# Patient Record
Sex: FEMALE | Race: OTHER | HISPANIC OR LATINO
[De-identification: names, ages, dates, MRNs, and addresses within clinical notes are randomized per-mention and may not be internally consistent; named-entity substitution may affect disease eponyms.]

---

## 2017-02-27 ENCOUNTER — APPOINTMENT (OUTPATIENT)
Dept: OPHTHALMOLOGY | Facility: CLINIC | Age: 52
End: 2017-02-27

## 2017-03-06 ENCOUNTER — APPOINTMENT (OUTPATIENT)
Dept: OPHTHALMOLOGY | Facility: CLINIC | Age: 52
End: 2017-03-06

## 2017-10-09 ENCOUNTER — APPOINTMENT (OUTPATIENT)
Dept: OPHTHALMOLOGY | Facility: CLINIC | Age: 52
End: 2017-10-09
Payer: COMMERCIAL

## 2017-10-09 DIAGNOSIS — H35.89 OTHER SPECIFIED RETINAL DISORDERS: ICD-10-CM

## 2017-10-09 PROCEDURE — 92134 CPTRZ OPH DX IMG PST SGM RTA: CPT

## 2017-10-09 PROCEDURE — 68761 CLOSE TEAR DUCT OPENING: CPT | Mod: E2,E4

## 2017-10-09 PROCEDURE — 92004 COMPRE OPH EXAM NEW PT 1/>: CPT | Mod: 25

## 2017-11-27 ENCOUNTER — APPOINTMENT (OUTPATIENT)
Dept: OPHTHALMOLOGY | Facility: CLINIC | Age: 52
End: 2017-11-27

## 2018-02-07 ENCOUNTER — APPOINTMENT (OUTPATIENT)
Dept: OPHTHALMOLOGY | Facility: CLINIC | Age: 53
End: 2018-02-07
Payer: COMMERCIAL

## 2018-02-07 PROCEDURE — 92012 INTRM OPH EXAM EST PATIENT: CPT

## 2018-03-07 ENCOUNTER — APPOINTMENT (OUTPATIENT)
Dept: OPHTHALMOLOGY | Facility: CLINIC | Age: 53
End: 2018-03-07

## 2018-03-14 ENCOUNTER — APPOINTMENT (OUTPATIENT)
Dept: OPHTHALMOLOGY | Facility: CLINIC | Age: 53
End: 2018-03-14
Payer: COMMERCIAL

## 2018-03-14 DIAGNOSIS — H04.123 DRY EYE SYNDROME OF BILATERAL LACRIMAL GLANDS: ICD-10-CM

## 2018-03-14 PROCEDURE — 92012 INTRM OPH EXAM EST PATIENT: CPT

## 2020-12-10 ENCOUNTER — APPOINTMENT (OUTPATIENT)
Dept: OPHTHALMOLOGY | Facility: CLINIC | Age: 55
End: 2020-12-10
Payer: COMMERCIAL

## 2020-12-10 ENCOUNTER — NON-APPOINTMENT (OUTPATIENT)
Age: 55
End: 2020-12-10

## 2020-12-10 PROCEDURE — 92014 COMPRE OPH EXAM EST PT 1/>: CPT

## 2020-12-10 PROCEDURE — 99072 ADDL SUPL MATRL&STAF TM PHE: CPT

## 2020-12-15 ENCOUNTER — TRANSCRIPTION ENCOUNTER (OUTPATIENT)
Age: 55
End: 2020-12-15

## 2020-12-21 ENCOUNTER — EMERGENCY (EMERGENCY)
Facility: HOSPITAL | Age: 55
LOS: 1 days | Discharge: HOME | End: 2020-12-21
Admitting: EMERGENCY MEDICINE
Payer: COMMERCIAL

## 2020-12-21 VITALS
DIASTOLIC BLOOD PRESSURE: 74 MMHG | OXYGEN SATURATION: 98 % | HEART RATE: 86 BPM | SYSTOLIC BLOOD PRESSURE: 126 MMHG | TEMPERATURE: 98 F | RESPIRATION RATE: 17 BRPM

## 2020-12-21 DIAGNOSIS — Z20.828 CONTACT WITH AND (SUSPECTED) EXPOSURE TO OTHER VIRAL COMMUNICABLE DISEASES: ICD-10-CM

## 2020-12-21 LAB — SARS-COV-2 RNA SPEC QL NAA+PROBE: SIGNIFICANT CHANGE UP

## 2020-12-21 PROCEDURE — 99283 EMERGENCY DEPT VISIT LOW MDM: CPT

## 2020-12-21 PROCEDURE — U0003: CPT

## 2020-12-21 NOTE — ED PROVIDER NOTE - PATIENT PORTAL LINK FT
You can access the FollowMyHealth Patient Portal offered by [facility] by registering at the following website: [email or web address]. By joining [facility]’s FollowMyHealth portal, you will also be able to view your health information using other applications (apps) compatible with our system.

## 2021-02-09 ENCOUNTER — APPOINTMENT (OUTPATIENT)
Dept: OPHTHALMOLOGY | Facility: CLINIC | Age: 56
End: 2021-02-09
Payer: COMMERCIAL

## 2021-02-09 ENCOUNTER — NON-APPOINTMENT (OUTPATIENT)
Age: 56
End: 2021-02-09

## 2021-02-09 PROCEDURE — 92012 INTRM OPH EXAM EST PATIENT: CPT

## 2021-02-09 PROCEDURE — 92285 EXTERNAL OCULAR PHOTOGRAPHY: CPT

## 2021-02-09 PROCEDURE — 99072 ADDL SUPL MATRL&STAF TM PHE: CPT

## 2021-03-24 ENCOUNTER — APPOINTMENT (OUTPATIENT)
Dept: ORTHOPEDIC SURGERY | Facility: CLINIC | Age: 56
End: 2021-03-24
Payer: COMMERCIAL

## 2021-03-24 VITALS
BODY MASS INDEX: 24.3 KG/M2 | SYSTOLIC BLOOD PRESSURE: 110 MMHG | HEIGHT: 56 IN | WEIGHT: 108 LBS | DIASTOLIC BLOOD PRESSURE: 70 MMHG

## 2021-03-24 DIAGNOSIS — M25.511 PAIN IN RIGHT SHOULDER: ICD-10-CM

## 2021-03-24 DIAGNOSIS — Z87.39 PERSONAL HISTORY OF OTHER DISEASES OF THE MUSCULOSKELETAL SYSTEM AND CONNECTIVE TISSUE: ICD-10-CM

## 2021-03-24 DIAGNOSIS — M75.121 COMPLETE ROTATOR CUFF TEAR OR RUPTURE OF RIGHT SHOULDER, NOT SPECIFIED AS TRAUMATIC: ICD-10-CM

## 2021-03-24 DIAGNOSIS — Z78.9 OTHER SPECIFIED HEALTH STATUS: ICD-10-CM

## 2021-03-24 PROCEDURE — 99072 ADDL SUPL MATRL&STAF TM PHE: CPT

## 2021-03-24 PROCEDURE — 99204 OFFICE O/P NEW MOD 45 MIN: CPT

## 2021-03-24 RX ORDER — SIMVASTATIN 40 MG/1
TABLET, FILM COATED ORAL
Refills: 0 | Status: ACTIVE | COMMUNITY

## 2021-03-24 NOTE — DISCUSSION/SUMMARY
[de-identified] : 55 year old female right hand dominant with 3 year history of previous right shoulder issue treated effectively with Physical therapy now with recurrent new exacerbation for over 4 months with pain in classic RC positions. and supraspinatus weakness.\par X rays WNL\par She has now failed a second trial of physical therapy perscribed by her Primary care. No benefit\par Plan \par MRI R/o RC tear specifically supraspinatus and check LHB. \par F/U 2 weeks \par

## 2021-03-24 NOTE — HISTORY OF PRESENT ILLNESS
[de-identified] : 56 y/o female presents for Right Shoulder pain. Patient is right hand dominant and works as a . She states that shes had Right Shoulder pain 3 years ago which was treated with Physical Therapy with good improvement. However, in February 2021 she states that her pain returned now sharp and has effected her daily activities. She states that she has no ROM and is unable to due any heavy lifting or overhead activity. She initially followed her care with her PCP who sent her to [facility], her first session was yesterday with no improvement. Patient has brought in xray imaging from [facility] along with report for review.

## 2021-03-24 NOTE — PHYSICAL EXAM
[de-identified] : Patient is well nourished, well-developed, in no acute distress, with appropriate mood and affect. The patient is oriented to time, place, and person. Gait evaluation does not reveal a limp. The skin examination does not reveal obvious lesions, lacerations, or ecchymosis.\par right shoulder  ER 80 IR L1 [id number] with T5 opposite. \par Positive Neer and Byers and SS 4/5 ER Subscap 5/5 positive Speeds test \par  [de-identified] : Reviewed old x rays taken at an ER which showed normal bony anatomy and no head elevation subluxation or Fx.

## 2021-04-05 RX ORDER — TRAMADOL HYDROCHLORIDE 50 MG/1
50 TABLET, COATED ORAL 3 TIMES DAILY
Qty: 21 | Refills: 0 | Status: ACTIVE | COMMUNITY
Start: 2021-04-05 | End: 1900-01-01

## 2021-04-15 ENCOUNTER — APPOINTMENT (OUTPATIENT)
Dept: ORTHOPEDIC SURGERY | Facility: CLINIC | Age: 56
End: 2021-04-15
Payer: COMMERCIAL

## 2021-04-15 VITALS — WEIGHT: 108 LBS | HEIGHT: 56 IN | BODY MASS INDEX: 24.3 KG/M2

## 2021-04-15 PROCEDURE — 99214 OFFICE O/P EST MOD 30 MIN: CPT | Mod: 25

## 2021-04-15 PROCEDURE — 99072 ADDL SUPL MATRL&STAF TM PHE: CPT

## 2021-04-15 PROCEDURE — 20610 DRAIN/INJ JOINT/BURSA W/O US: CPT | Mod: RT

## 2021-04-15 NOTE — PHYSICAL EXAM
[de-identified] : Patient is well nourished, well-developed, in no acute distress, with appropriate mood and affect. The patient is oriented to time, place, and person. Gait evaluation does not reveal a limp. The skin examination does not reveal obvious lesions, lacerations, or ecchymosis.\par Right shoulder very tender AC joint. \par Positive impingement sign pos Neer but 4+ SS 5/5 IS\par Injection test in AC joint took away all pain and brought back full isometric strength. \par \par  [de-identified] : MRI images and report reviewed which show AC joint OA/ significant tendonitis and impingement with SA spur. SMall interstitial cyst or fissure. \par

## 2021-04-15 NOTE — PROCEDURE
[de-identified] : The right shoulder was prepped with alchohol and ethyl chloride was used to numb the skin. A 5 cc injection with 2 cc xylocaine, 2cc sensoricaine and 1 cc depomedrol , was given without complication into the AC joint and SA space. . Patient instructed that there may be an inflammatory flare for 24 hrs , to use ice or advil if needed\par

## 2021-04-15 NOTE — HISTORY OF PRESENT ILLNESS
[de-identified] : 56 y/o female presents for Right Shoulder. Patient is in office to today to review her MRI results done at [facility] on 4/10/2021. PT has helped but she still has a lot of right shoulder discomfort and some ROM limitation.

## 2021-04-15 NOTE — DISCUSSION/SUMMARY
[de-identified] : 55 year old  with persistent impingement and RC tendonitis and bursitis with predominant AC joint symptoms from djd. \par MRI shows no major full thickness tears so waiting on surgery is not a problem. \par She doesn't want to be out of work so I am suggesting AC and SA injection with further PT. \par \par \par

## 2021-06-03 ENCOUNTER — APPOINTMENT (OUTPATIENT)
Dept: ORTHOPEDIC SURGERY | Facility: CLINIC | Age: 56
End: 2021-06-03
Payer: COMMERCIAL

## 2021-06-03 PROCEDURE — 99214 OFFICE O/P EST MOD 30 MIN: CPT | Mod: 25

## 2021-06-03 PROCEDURE — 20605 DRAIN/INJ JOINT/BURSA W/O US: CPT | Mod: RT

## 2021-06-03 NOTE — PROCEDURE
[de-identified] : The right elbow was prepped with alcohol and ethyl chloride was used to numb the skin. A 3 cc injection with 1 cc xylocaine, 1cc sensoricaine and 1 cc depomedrol , was given without complication into the /latera] epicondyle. Patient instructed that there may be an inflammatory flare for 24 hrs , to use ice or advil if needed\par \par \par

## 2021-06-03 NOTE — PHYSICAL EXAM
[de-identified] : right shoulder has full AROM PROM\par Neg Byers today and neg Neer\par Right elbow tender at lateral epicondyle and some discomfort at wrist and elbow with resisted dorsiflexion. \par No pain at wrist or elbow after lateral epicondyltiis injection.

## 2021-06-03 NOTE — HISTORY OF PRESENT ILLNESS
[de-identified] : 54 y/o female presents for Right Shoulder follow up. Patient states that she is happy with the progress that she's made with physical therapy as it has improved her limitation of ROM, she states that she still has pain intermittently. Her new compliant is of her elbow she states that she'll experience pain when pulling heavy items

## 2021-06-03 NOTE — DISCUSSION/SUMMARY
[de-identified] : Shoulder has done well with AC joint and SA injection . Little to no pain now and can work. As she doesn’t want surgery this is good news. \par However she now complains of difficulty opening a jar with wrist extension and torque. Exam reveals lateral epicondyltitis and injection there took away pain. \par If she has further wrist pain will recommend hand specialist upstairs. for follow up. \par

## 2021-07-14 ENCOUNTER — APPOINTMENT (OUTPATIENT)
Dept: ORTHOPEDIC SURGERY | Facility: CLINIC | Age: 56
End: 2021-07-14

## 2021-08-05 ENCOUNTER — APPOINTMENT (OUTPATIENT)
Dept: ORTHOPEDIC SURGERY | Facility: CLINIC | Age: 56
End: 2021-08-05
Payer: COMMERCIAL

## 2021-08-05 VITALS — WEIGHT: 108 LBS | BODY MASS INDEX: 24.3 KG/M2 | HEIGHT: 56 IN

## 2021-08-05 PROCEDURE — 99214 OFFICE O/P EST MOD 30 MIN: CPT

## 2021-08-05 NOTE — PHYSICAL EXAM
[de-identified] : Right shoulder tender Ac joint Positive Neer and Byers but SS IS subscap 5/5 isometric strength. \par

## 2021-08-05 NOTE — HISTORY OF PRESENT ILLNESS
[de-identified] : 54 y/o female presents for Right Shoulder follow up. Patient states that she hasn’t noticed any improvement since her last visit and would like to discuss surgery options .Pain is predominantly at superior AC joint.

## 2021-08-05 NOTE — DISCUSSION/SUMMARY
[de-identified] : Gertrude has recurrent AC joint pain with x-rays and MRI showing severe AC arthropathy and moderate impingement with only shallow partial SS tear. \par Plan \par Arthroscopic Ac resection and SAD. Repair only if partial tear is greater than 30 %. \par She understands the difference between the two surgeries. and their post op course. \par She will have medical clearance and then return for teaching session

## 2021-08-19 ENCOUNTER — APPOINTMENT (OUTPATIENT)
Dept: ORTHOPEDIC SURGERY | Facility: CLINIC | Age: 56
End: 2021-08-19
Payer: COMMERCIAL

## 2021-08-19 PROCEDURE — 99214 OFFICE O/P EST MOD 30 MIN: CPT

## 2021-08-19 RX ORDER — OXYCODONE AND ACETAMINOPHEN 5; 325 MG/1; MG/1
5-325 TABLET ORAL EVERY 4 HOURS
Qty: 80 | Refills: 0 | Status: ACTIVE | COMMUNITY
Start: 2021-08-19 | End: 1900-01-01

## 2021-08-19 NOTE — DISCUSSION/SUMMARY
[de-identified] : Full post op and pre op teaching program was performed today. Risks and benefits of the surgical procedure were discussed informed consent obtained and post op exercises described. Post op meds and PO rehab were arranged,\par All questions were answered.\par Patient will have AC resection and SAD with possible Rc repair if needed. \par \par

## 2021-08-19 NOTE — HISTORY OF PRESENT ILLNESS
[de-identified] : 54 y/o female presents for pre teaching for upcoming surgery on 8/27/21 for Right Shoulder Arthroscopic AC Joint resection with sub acromial decompression.She has a small partial thickness tear but hopefully less than 20% so no repair needed. \par

## 2021-08-26 ENCOUNTER — TRANSCRIPTION ENCOUNTER (OUTPATIENT)
Age: 56
End: 2021-08-26

## 2021-08-27 ENCOUNTER — APPOINTMENT (OUTPATIENT)
Dept: ORTHOPEDIC SURGERY | Facility: AMBULATORY SURGERY CENTER | Age: 56
End: 2021-08-27

## 2021-08-27 ENCOUNTER — OUTPATIENT (OUTPATIENT)
Dept: OUTPATIENT SERVICES | Facility: HOSPITAL | Age: 56
LOS: 1 days | Discharge: HOME | End: 2021-08-27
Payer: COMMERCIAL

## 2021-08-27 PROCEDURE — 29824 SHO ARTHRS SRG DSTL CLAVICLC: CPT | Mod: RT

## 2021-08-27 PROCEDURE — 29826 SHO ARTHRS SRG DECOMPRESSION: CPT | Mod: RT

## 2021-09-08 ENCOUNTER — APPOINTMENT (OUTPATIENT)
Dept: ORTHOPEDIC SURGERY | Facility: CLINIC | Age: 56
End: 2021-09-08
Payer: COMMERCIAL

## 2021-09-08 PROCEDURE — 73030 X-RAY EXAM OF SHOULDER: CPT | Mod: RT

## 2021-09-08 PROCEDURE — 99024 POSTOP FOLLOW-UP VISIT: CPT

## 2021-09-13 NOTE — HISTORY OF PRESENT ILLNESS
[Clean/Dry/Intact] : clean, dry and intact [Doing Well] : is doing well [Excellent Pain Control] : has excellent pain control [No Sign of Infection] : is showing no signs of infection [None] : None [Healed] : healed [Chills] : no chills [Fever] : no fever [Nausea] : no nausea [Vomiting] : no vomiting [Erythema] : not erythematous [Discharge] : absent of discharge [de-identified] : DOS: 8/27/21 [de-identified] : 12 days s/p Right shoulder Arthroscopic acromioclavicular joint resection and arthroscopic subacromial decompression.   [de-identified] : 2 views of let shoulder show well resected AC joint.  [de-identified] : Doing well. Patient seen by MA to remove stitches and begin PT

## 2021-10-06 ENCOUNTER — APPOINTMENT (OUTPATIENT)
Dept: ORTHOPEDIC SURGERY | Facility: CLINIC | Age: 56
End: 2021-10-06
Payer: COMMERCIAL

## 2021-10-06 PROCEDURE — 99024 POSTOP FOLLOW-UP VISIT: CPT

## 2021-10-06 NOTE — HISTORY OF PRESENT ILLNESS
[de-identified] : 57 y/o  seen for follow up of her right shoulder nearly 5 weeks s/p arthroscopic subacromial decompression and AC joint resection on 8/27/21. Patient reports attending her physical therapy but unfortunately she has been continuing to use her sling. She has not yet returned to work. She does report some pain while sleeping on her right shoulder.

## 2021-10-06 NOTE — DISCUSSION/SUMMARY
[de-identified] : 57 y/o  now nearly 5 weeks s/p arthroscopic subacromial decompression and AC joint resection on 8/27/21. She is doing PT, however she has been continuing to wear her sling and is too tight with External rotation and IR. . There is concern she may be developing a contracture and early adhesive adhesive capsulitis. We will have her d/c her sling immediately and continue physical therapy to aggressively increase her ROM and start isotonic strengthening as tolerate. \par \par Plan:\par - PT x 4 weeks to increase ROM Progressive RC PRE and scapular strengthening as tolerated \par - F/U in 3 weeks to make sure her shoulder is not becoming frozen.

## 2021-10-06 NOTE — PHYSICAL EXAM
[de-identified] : Patient is well nourished, well-developed, in no acute distress, with appropriate mood and affect. The patient is oriented to time, place, and person. Gait evaluation does not reveal a limp. The skin examination does not reveal obvious lesions, lacerations, or ecchymosis.\par \par Right shoulder: Portal incision c/d/i. Restricted ROM in the safe zone. No AC joint tenderness.

## 2021-10-07 RX ORDER — MELOXICAM 15 MG/1
15 TABLET ORAL DAILY
Qty: 30 | Refills: 3 | Status: COMPLETED | COMMUNITY
Start: 2021-08-19 | End: 2022-02-04

## 2021-10-27 ENCOUNTER — APPOINTMENT (OUTPATIENT)
Dept: ORTHOPEDIC SURGERY | Facility: CLINIC | Age: 56
End: 2021-10-27
Payer: COMMERCIAL

## 2021-10-27 VITALS — BODY MASS INDEX: 24.3 KG/M2 | WEIGHT: 108 LBS | HEIGHT: 56 IN

## 2021-10-27 DIAGNOSIS — M77.10 LATERAL EPICONDYLITIS, UNSPECIFIED ELBOW: ICD-10-CM

## 2021-10-27 DIAGNOSIS — M19.011 PRIMARY OSTEOARTHRITIS, RIGHT SHOULDER: ICD-10-CM

## 2021-10-27 PROCEDURE — 20551 NJX 1 TENDON ORIGIN/INSJ: CPT | Mod: 79,RT

## 2021-10-27 PROCEDURE — 99213 OFFICE O/P EST LOW 20 MIN: CPT | Mod: 24,25

## 2021-10-27 RX ORDER — METHYLPREDNISOLONE 4 MG/1
4 TABLET ORAL
Qty: 1 | Refills: 0 | Status: ACTIVE | COMMUNITY
Start: 2021-10-27 | End: 1900-01-01

## 2021-10-27 NOTE — PHYSICAL EXAM
[de-identified] : Patient is well nourished, well-developed, in no acute distress, with appropriate mood and affect. The patient is oriented to time, place, and person. Gait evaluation does not reveal a limp. The skin examination does not reveal obvious lesions, lacerations, or ecchymosis. \par \par Cervical Spine:  \par No tenderness to palpation over the cervical spine in the midline.Tender to palpation of the right paraspinal muscles.  \par Full range of motion with pain in extension \par Positive Spurling test.\par \par RIght Shoulder: AROM . ER 80. IR T8. 5/5 SS/IS/Subscab. Positive Neer. Positive cross arm impingement. No AC tenderness. \par \par Motor: 5/5 Shoulder Abd/Biceps/Triceps/Wrist Flex/Wrist Ext/IO/ bilaterally \par SILT bilaterally from C4-T2\par Pulses:\par Radial 2+ b/l, CR <2 sec bilaterally\par \par \par \par

## 2021-10-27 NOTE — REASON FOR VISIT
[Post Operative Visit] : a post operative visit for [Shoulder Impingement] : shoulder impingement [Shoulder Arthritis] : shoulder arthritis

## 2021-10-27 NOTE — PROCEDURE
[de-identified] : The right elbow was prepped with alcohol and ethyl chloride was used to numb the skin. A 3 cc injection with 1 cc xylocaine, 1cc sensoricaine and 1 cc depomedrol , was given without complication into the lateral epicondyle. Patient instructed that there may be an inflammatory flare for 24 hrs , to use ice or advil if needed\par \par

## 2021-10-27 NOTE — HISTORY OF PRESENT ILLNESS
[de-identified] : 57 y/o  seen for follow up of her right shoulder 8 weeks s/p arthroscopic subacromial decompression and AC joint resection on 8/27/21 with early contracture. Patient is out of her sling and reports attending her physical therapy. She reports her ROM is much improved.  She is however complaining of lateral right elbow pain and some radicular cervical pain.

## 2021-10-27 NOTE — DISCUSSION/SUMMARY
[de-identified] : 55 y/o  now 8 weeks s/p arthroscopic subacromial decompression and AC joint resection on 8/27/21. There was concern for post-operative contracture/ adhesive capsulitis last visit, but her ROM is now much improved although she is still lacking the last few degrees of ER. She also has cervical radiculopathy and lateral epicondylitis of her right elbow which are giving her continued pain. We will give her a medrol dose pack today for her radicular pain and a cortisone injection for her right elbow. \par \par Plan:\par - Start Medrol dose pack for cervical radiculopathy. Stop Mobic while on medrol. \par - Cortisone injection today for right elbow\par - Continue PT for post op contracture and cervical radiculopathy. \par - F/U in 4 weeks. Will consider C-Spine MRI if radicular symptoms persist.

## 2021-12-08 ENCOUNTER — APPOINTMENT (OUTPATIENT)
Dept: ORTHOPEDIC SURGERY | Facility: CLINIC | Age: 56
End: 2021-12-08
Payer: COMMERCIAL

## 2021-12-08 DIAGNOSIS — M75.01 ADHESIVE CAPSULITIS OF RIGHT SHOULDER: ICD-10-CM

## 2021-12-08 DIAGNOSIS — M75.41 IMPINGEMENT SYNDROME OF RIGHT SHOULDER: ICD-10-CM

## 2021-12-08 PROCEDURE — 99213 OFFICE O/P EST LOW 20 MIN: CPT

## 2021-12-08 NOTE — HISTORY OF PRESENT ILLNESS
[de-identified] : 3 and 1/2 months s/p [place] surgery with full return of function and no pain.

## 2021-12-08 NOTE — DISCUSSION/SUMMARY
[de-identified] : Final visit post AC resection and SAD with mild capsulitis post op. \par Now fully resolved with pain free full angy of that shoulder. \par Plan 'd/c from care.

## 2024-02-12 ENCOUNTER — APPOINTMENT (OUTPATIENT)
Dept: OPHTHALMOLOGY | Facility: CLINIC | Age: 59
End: 2024-02-12

## 2024-04-25 ENCOUNTER — APPOINTMENT (OUTPATIENT)
Dept: ORTHOPEDIC SURGERY | Facility: CLINIC | Age: 59
End: 2024-04-25
Payer: COMMERCIAL

## 2024-04-25 DIAGNOSIS — M54.12 RADICULOPATHY, CERVICAL REGION: ICD-10-CM

## 2024-04-25 DIAGNOSIS — M47.816 SPONDYLOSIS W/OUT MYELOPATHY OR RADICULOPATHY, LUMBAR REGION: ICD-10-CM

## 2024-04-25 PROCEDURE — 99214 OFFICE O/P EST MOD 30 MIN: CPT

## 2024-04-25 RX ORDER — METHYLPREDNISOLONE 4 MG/1
4 TABLET ORAL
Qty: 1 | Refills: 0 | Status: ACTIVE | COMMUNITY
Start: 2024-04-25 | End: 1900-01-01

## 2024-04-25 NOTE — PHYSICAL EXAM
[de-identified] : INSPECTION (-) Skin lesion, (-) Scars, (-) Asymmetry, (-) Swelling, (-) Atrophy, (-) Hypertrophy   PALPATION (+) Tenderness to palpation paracervical, paralumbar musculature.   Cervical spine range of motion Flexion-70 Extension-30 lateral flexion R/L-40/40 Lateral rotation R/L-70/70  Lumbar spine: +TTP lumbar paraspinals +pain with lumbar flexion +pain with lumbar hyperextension Light touch sensation preserved B/L LE (+)SLR     STRENGTH Rotator cuff strength is 5/5 with manual muscle testing. 5/5 B/L LE     NEUROVASCULAR EXAM Sensation-intact C4-T1 Motor-Grossly Intact   SPECIAL TESTS: (+) Spurling's Test (+) SLR [de-identified] : X-rays of the cervical spine performed today demonstrates DDD . X-rays of L-S spine performed  today demonstrates lumbar DDD. All studies personally reviewed by me

## 2024-04-25 NOTE — DISCUSSION/SUMMARY
[de-identified] : Impression: 57 y/o F with cervical/Lumbar DDD with radiculopathy symptomatic for 4 weeks.  Plan: To address inflammation we have prescribed a 4mg Medrol Dose Pack.  Ms. Ramirez will also start supervised physical therapy for to improve posture, flexibility, relieve muscular spasm, and mitigate pain.  She will follow up in 6 weeks for re-evaluation.

## 2024-04-25 NOTE — REASON FOR VISIT
[Follow-Up Visit] : a follow-up visit for Please take medications as prescribed. Please return for worsening symptoms, worsening pain, worsening discharge. Please follow up with your primary doctor and OBGYN.

## 2024-04-25 NOTE — HISTORY OF PRESENT ILLNESS
[7] : a current pain level of 7/10 [de-identified] : JAIME CARRION 58 year female 2 year post right shoulder surgery. She presents today c/o neck and low back pain since 4 weeks ago secondary to sleeping position. Her neck pain radiates into her left upper extremity while her low back pain radiates into her left leg. Prolonged sitting and standing provokes her pain symptoms.

## 2024-05-14 ENCOUNTER — NON-APPOINTMENT (OUTPATIENT)
Age: 59
End: 2024-05-14

## 2024-05-14 ENCOUNTER — APPOINTMENT (OUTPATIENT)
Dept: OPHTHALMOLOGY | Facility: CLINIC | Age: 59
End: 2024-05-14
Payer: COMMERCIAL

## 2024-05-14 PROCEDURE — 92134 CPTRZ OPH DX IMG PST SGM RTA: CPT

## 2024-05-14 PROCEDURE — 92004 COMPRE OPH EXAM NEW PT 1/>: CPT | Mod: 25

## 2024-05-16 ENCOUNTER — TRANSCRIPTION ENCOUNTER (OUTPATIENT)
Age: 59
End: 2024-05-16

## 2024-06-18 ENCOUNTER — RX RENEWAL (OUTPATIENT)
Age: 59
End: 2024-06-18

## 2024-06-18 RX ORDER — MELOXICAM 15 MG/1
15 TABLET ORAL DAILY
Qty: 30 | Refills: 1 | Status: ACTIVE | COMMUNITY
Start: 2024-04-25 | End: 1900-01-01

## 2024-07-08 ENCOUNTER — APPOINTMENT (OUTPATIENT)
Dept: ORTHOPEDIC SURGERY | Facility: CLINIC | Age: 59
End: 2024-07-08
Payer: COMMERCIAL

## 2024-07-08 PROCEDURE — 99213 OFFICE O/P EST LOW 20 MIN: CPT

## 2024-07-25 ENCOUNTER — APPOINTMENT (OUTPATIENT)
Dept: ORTHOPEDIC SURGERY | Facility: CLINIC | Age: 59
End: 2024-07-25

## 2024-10-08 ENCOUNTER — APPOINTMENT (OUTPATIENT)
Dept: ORTHOPEDIC SURGERY | Facility: CLINIC | Age: 59
End: 2024-10-08
Payer: COMMERCIAL

## 2024-10-08 VITALS — WEIGHT: 113 LBS | BODY MASS INDEX: 25.42 KG/M2 | RESPIRATION RATE: 16 BRPM | HEIGHT: 56 IN

## 2024-10-08 DIAGNOSIS — S69.92XA UNSPECIFIED INJURY OF LEFT WRIST, HAND AND FINGER(S), INITIAL ENCOUNTER: ICD-10-CM

## 2024-10-08 PROCEDURE — 99204 OFFICE O/P NEW MOD 45 MIN: CPT | Mod: 25

## 2024-10-08 PROCEDURE — 73130 X-RAY EXAM OF HAND: CPT | Mod: LT

## 2024-10-08 RX ORDER — ROSUVASTATIN CALCIUM 5 MG/1
TABLET, FILM COATED ORAL
Refills: 0 | Status: ACTIVE | COMMUNITY

## 2024-12-11 ENCOUNTER — APPOINTMENT (OUTPATIENT)
Dept: ORTHOPEDIC SURGERY | Facility: CLINIC | Age: 59
End: 2024-12-11
Payer: COMMERCIAL

## 2024-12-11 DIAGNOSIS — S69.92XA UNSPECIFIED INJURY OF LEFT WRIST, HAND AND FINGER(S), INITIAL ENCOUNTER: ICD-10-CM

## 2024-12-11 PROCEDURE — 99213 OFFICE O/P EST LOW 20 MIN: CPT

## 2025-03-24 ENCOUNTER — OUTPATIENT (OUTPATIENT)
Dept: OUTPATIENT SERVICES | Facility: HOSPITAL | Age: 60
LOS: 1 days | End: 2025-03-24
Payer: COMMERCIAL

## 2025-03-24 ENCOUNTER — APPOINTMENT (OUTPATIENT)
Dept: ORTHOPEDIC SURGERY | Facility: CLINIC | Age: 60
End: 2025-03-24
Payer: COMMERCIAL

## 2025-03-24 ENCOUNTER — RESULT REVIEW (OUTPATIENT)
Age: 60
End: 2025-03-24

## 2025-03-24 VITALS — SYSTOLIC BLOOD PRESSURE: 99 MMHG | OXYGEN SATURATION: 98 % | HEART RATE: 70 BPM | DIASTOLIC BLOOD PRESSURE: 65 MMHG

## 2025-03-24 DIAGNOSIS — M65.971 UNSPECIFIED SYNOVITIS AND TENOSYNOVITIS, RIGHT ANK/FT: ICD-10-CM

## 2025-03-24 PROCEDURE — 73630 X-RAY EXAM OF FOOT: CPT

## 2025-03-24 PROCEDURE — 99204 OFFICE O/P NEW MOD 45 MIN: CPT | Mod: 25

## 2025-03-24 PROCEDURE — 73630 X-RAY EXAM OF FOOT: CPT | Mod: 26,LT,RT

## 2025-03-24 PROCEDURE — 73630 X-RAY EXAM OF FOOT: CPT | Mod: LT,RT

## 2025-03-24 PROCEDURE — 73600 X-RAY EXAM OF ANKLE: CPT | Mod: RT

## 2025-03-24 PROCEDURE — 73600 X-RAY EXAM OF ANKLE: CPT

## 2025-03-24 PROCEDURE — 73600 X-RAY EXAM OF ANKLE: CPT | Mod: 26,RT

## 2025-03-27 RX ORDER — MELOXICAM 7.5 MG/1
7.5 TABLET ORAL
Qty: 30 | Refills: 1 | Status: ACTIVE | COMMUNITY
Start: 2025-03-27 | End: 1900-01-01

## 2025-05-13 ENCOUNTER — APPOINTMENT (OUTPATIENT)
Dept: OPHTHALMOLOGY | Facility: CLINIC | Age: 60
End: 2025-05-13

## 2025-05-19 ENCOUNTER — APPOINTMENT (OUTPATIENT)
Dept: ORTHOPEDIC SURGERY | Facility: CLINIC | Age: 60
End: 2025-05-19